# Patient Record
Sex: FEMALE | Race: WHITE | URBAN - METROPOLITAN AREA
[De-identification: names, ages, dates, MRNs, and addresses within clinical notes are randomized per-mention and may not be internally consistent; named-entity substitution may affect disease eponyms.]

---

## 2017-02-15 PROBLEM — Z98.890: Noted: 2017-02-15

## 2017-02-15 PROBLEM — Z98.890: Noted: 2020-11-24

## 2017-02-15 PROBLEM — Z98.890: Noted: 2017-02-01

## 2017-03-14 ENCOUNTER — IMPORTED ENCOUNTER (OUTPATIENT)
Dept: URBAN - METROPOLITAN AREA CLINIC 50 | Facility: CLINIC | Age: 77
End: 2017-03-14

## 2017-03-15 ENCOUNTER — IMPORTED ENCOUNTER (OUTPATIENT)
Dept: URBAN - METROPOLITAN AREA CLINIC 50 | Facility: CLINIC | Age: 77
End: 2017-03-15

## 2017-03-21 ENCOUNTER — IMPORTED ENCOUNTER (OUTPATIENT)
Dept: URBAN - METROPOLITAN AREA CLINIC 50 | Facility: CLINIC | Age: 77
End: 2017-03-21

## 2017-12-05 ENCOUNTER — IMPORTED ENCOUNTER (OUTPATIENT)
Dept: URBAN - METROPOLITAN AREA CLINIC 50 | Facility: CLINIC | Age: 77
End: 2017-12-05

## 2017-12-12 ENCOUNTER — IMPORTED ENCOUNTER (OUTPATIENT)
Dept: URBAN - METROPOLITAN AREA CLINIC 50 | Facility: CLINIC | Age: 77
End: 2017-12-12

## 2018-12-04 ENCOUNTER — IMPORTED ENCOUNTER (OUTPATIENT)
Dept: URBAN - METROPOLITAN AREA CLINIC 50 | Facility: CLINIC | Age: 78
End: 2018-12-04

## 2019-05-06 ENCOUNTER — IMPORTED ENCOUNTER (OUTPATIENT)
Dept: URBAN - METROPOLITAN AREA CLINIC 50 | Facility: CLINIC | Age: 79
End: 2019-05-06

## 2019-05-14 ENCOUNTER — IMPORTED ENCOUNTER (OUTPATIENT)
Dept: URBAN - METROPOLITAN AREA CLINIC 50 | Facility: CLINIC | Age: 79
End: 2019-05-14

## 2019-12-03 ENCOUNTER — IMPORTED ENCOUNTER (OUTPATIENT)
Dept: URBAN - METROPOLITAN AREA CLINIC 50 | Facility: CLINIC | Age: 79
End: 2019-12-03

## 2019-12-06 ENCOUNTER — IMPORTED ENCOUNTER (OUTPATIENT)
Dept: URBAN - METROPOLITAN AREA CLINIC 50 | Facility: CLINIC | Age: 79
End: 2019-12-06

## 2020-05-13 ENCOUNTER — IMPORTED ENCOUNTER (OUTPATIENT)
Dept: URBAN - METROPOLITAN AREA CLINIC 50 | Facility: CLINIC | Age: 80
End: 2020-05-13

## 2020-05-13 NOTE — PATIENT DISCUSSION
"""Start Warm compresses both eyes twice a day ."" ""Start Artificial tears both eyes BID - TID ."""

## 2020-05-18 ENCOUNTER — IMPORTED ENCOUNTER (OUTPATIENT)
Dept: URBAN - METROPOLITAN AREA CLINIC 50 | Facility: CLINIC | Age: 80
End: 2020-05-18

## 2020-11-12 ENCOUNTER — IMPORTED ENCOUNTER (OUTPATIENT)
Dept: URBAN - METROPOLITAN AREA CLINIC 50 | Facility: CLINIC | Age: 80
End: 2020-11-12

## 2020-11-24 ENCOUNTER — IMPORTED ENCOUNTER (OUTPATIENT)
Dept: URBAN - METROPOLITAN AREA CLINIC 50 | Facility: CLINIC | Age: 80
End: 2020-11-24

## 2020-12-16 ENCOUNTER — IMPORTED ENCOUNTER (OUTPATIENT)
Dept: URBAN - METROPOLITAN AREA CLINIC 50 | Facility: CLINIC | Age: 80
End: 2020-12-16

## 2021-04-18 ASSESSMENT — PACHYMETRY
OS_CT_UM: 575
OD_CT_UM: 571
OS_CT_UM: 575
OD_CT_UM: 571
OS_CT_UM: 575
OD_CT_UM: 571
OS_CT_UM: 575
OD_CT_UM: 571
OD_CT_UM: 571
OS_CT_UM: 575
OS_CT_UM: 575
OD_CT_UM: 571
OD_CT_UM: 571
OS_CT_UM: 575
OS_CT_UM: 575
OD_CT_UM: 571
OD_CT_UM: 571

## 2021-04-18 ASSESSMENT — VISUAL ACUITY
OS_SC: 20/25-2
OS_BAT: 20/30-
OD_SC: 20/20-1
OD_SC: 20/20
OD_CC: J1+
OD_SC: 20/20-2
OD_OTHER: 20/20. 20/20.
OS_CC: J1@ 14 IN
OD_BAT: 20/20
OS_SC: 20/20-
OD_OTHER: 20/30. 20/30-.
OD_BAT: 20/30
OS_CC: J1+@ 14 IN
OD_CC: J1+@ 14 IN
OS_SC: 20/20-1
OS_SC: 20/30+2
OD_SC: 20/20-2
OD_SC: 20/20-1
OD_CC: J1+
OD_OTHER: 20/25. 20/30.
OS_SC: 20/25
OD_SC: 20/20
OS_SC: 20/25+
OD_SC: 20/20
OS_OTHER: 20/30-. 20/60.
OD_SC: 20/20
OD_BAT: 20/20-2
OS_SC: 20/30-2
OD_BAT: 20/25
OS_CC: J1+
OS_SC: 20/30+2
OS_CC: J1+
OS_OTHER: 20/30. 20/40.
OD_CC: J1@ 14 IN
OD_SC: 20/20
OS_SC: 20/20
OS_BAT: 20/30

## 2021-04-18 ASSESSMENT — TONOMETRY
OD_IOP_MMHG: 15
OD_IOP_MMHG: 14
OS_IOP_MMHG: 14
OS_IOP_MMHG: 14
OS_IOP_MMHG: 13
OS_IOP_MMHG: 13
OD_IOP_MMHG: 13
OD_IOP_MMHG: 14
OS_IOP_MMHG: 16
OD_IOP_MMHG: 15
OS_IOP_MMHG: 13
OD_IOP_MMHG: 14
OS_IOP_MMHG: 13
OS_IOP_MMHG: 14
OS_IOP_MMHG: 14
OD_IOP_MMHG: 13
OS_IOP_MMHG: 14
OS_IOP_MMHG: 15
OD_IOP_MMHG: 15
OS_IOP_MMHG: 14
OS_IOP_MMHG: 15
OD_IOP_MMHG: 14
OS_IOP_MMHG: 13
OD_IOP_MMHG: 13
OD_IOP_MMHG: 13
OS_IOP_MMHG: 15
OD_IOP_MMHG: 16
OD_IOP_MMHG: 13

## 2021-05-19 ENCOUNTER — IMPORTED ENCOUNTER (OUTPATIENT)
Dept: URBAN - METROPOLITAN AREA CLINIC 52 | Facility: CLINIC | Age: 81
End: 2021-05-19

## 2021-05-20 ENCOUNTER — PROBLEM (OUTPATIENT)
Dept: URBAN - METROPOLITAN AREA CLINIC 52 | Facility: CLINIC | Age: 81
End: 2021-05-20

## 2021-05-20 DIAGNOSIS — H26.491: ICD-10-CM

## 2021-05-20 PROCEDURE — 66821 AFTER CATARACT LASER SURGERY: CPT

## 2021-05-20 PROCEDURE — 92134 CPTRZ OPH DX IMG PST SGM RTA: CPT

## 2021-05-20 PROCEDURE — 92014 COMPRE OPH EXAM EST PT 1/>: CPT

## 2021-05-20 RX ORDER — BROMFENAC SODIUM 0.7 MG/ML: 1 SOLUTION/ DROPS OPHTHALMIC TWICE A DAY

## 2021-05-20 ASSESSMENT — TONOMETRY
OD_IOP_MMHG: 15
OS_IOP_MMHG: 14
OS_IOP_MMHG: 15
OD_IOP_MMHG: 14

## 2021-05-20 ASSESSMENT — VISUAL ACUITY
OD_SC: 20/20-2
OS_SC: 20/20-1

## 2021-05-20 NOTE — PATIENT DISCUSSION
PCO ([id number]): Visually significant PCO present on exam today. Recommend YAG laser capsulotomy to improve vision and decrease glare symptoms. RBAs of procedure discussed. Patient agrees and wishes to proceed.

## 2021-05-20 NOTE — PROCEDURE NOTE: CLINICAL
PROCEDURE NOTE: YAG Capsulotomy OD. Diagnosis: Posterior Capsular Opacification (PCO). Prep: Mydriacil 1% and Phenylephrine 2.5%. Prior to treatment, the risks/benefits/alternatives were discussed. The patient wished to proceed with procedure. Consent was signed. Proparacaine and brominidine were placed into the operative eye after the eye was dilated. Power = 3.6mJ. Number of pulses = 35. Patient tolerated procedure well and there were no complications. Post Laser instructions given. Pantera Arboleda

## 2021-06-24 ENCOUNTER — YAG POST-OP (OUTPATIENT)
Dept: URBAN - METROPOLITAN AREA CLINIC 50 | Facility: CLINIC | Age: 81
End: 2021-06-24

## 2021-06-24 DIAGNOSIS — Z98.890: ICD-10-CM

## 2021-06-24 PROCEDURE — 99024 POSTOP FOLLOW-UP VISIT: CPT

## 2021-06-24 ASSESSMENT — VISUAL ACUITY
OU_CC: J1+
OS_SC: 20/25
OD_SC: 20/20-2

## 2021-06-24 ASSESSMENT — TONOMETRY
OD_IOP_MMHG: 17
OS_IOP_MMHG: 17

## 2021-11-22 ENCOUNTER — DIAGNOSTICS - HVF/OCT (OUTPATIENT)
Dept: URBAN - METROPOLITAN AREA CLINIC 50 | Facility: CLINIC | Age: 81
End: 2021-11-22

## 2021-11-22 DIAGNOSIS — H40.013: ICD-10-CM

## 2021-11-22 PROCEDURE — 92083 EXTENDED VISUAL FIELD XM: CPT

## 2021-11-22 PROCEDURE — 92133 CPTRZD OPH DX IMG PST SGM ON: CPT

## 2021-12-14 ENCOUNTER — COMPREHENSIVE EXAM (OUTPATIENT)
Dept: URBAN - METROPOLITAN AREA CLINIC 50 | Facility: CLINIC | Age: 81
End: 2021-12-14

## 2021-12-14 DIAGNOSIS — H04.123: ICD-10-CM

## 2021-12-14 DIAGNOSIS — Z01.01: ICD-10-CM

## 2021-12-14 DIAGNOSIS — H40.013: ICD-10-CM

## 2021-12-14 DIAGNOSIS — H02.834: ICD-10-CM

## 2021-12-14 DIAGNOSIS — H02.831: ICD-10-CM

## 2021-12-14 DIAGNOSIS — H43.813: ICD-10-CM

## 2021-12-14 PROCEDURE — 92015 DETERMINE REFRACTIVE STATE: CPT

## 2021-12-14 PROCEDURE — 92014 COMPRE OPH EXAM EST PT 1/>: CPT

## 2021-12-14 ASSESSMENT — VISUAL ACUITY
OS_SC: 20/25
OD_SC: 20/20-2
OS_CC: J1+
OD_CC: J1+
OU_CC: J1+
OU_SC: 20/20

## 2021-12-14 ASSESSMENT — TONOMETRY
OS_IOP_MMHG: 16
OD_IOP_MMHG: 16

## 2023-06-13 ENCOUNTER — COMPREHENSIVE EXAM (OUTPATIENT)
Dept: URBAN - METROPOLITAN AREA CLINIC 52 | Facility: CLINIC | Age: 83
End: 2023-06-13

## 2023-06-13 DIAGNOSIS — H43.813: ICD-10-CM

## 2023-06-13 DIAGNOSIS — H40.013: ICD-10-CM

## 2023-06-13 DIAGNOSIS — H04.123: ICD-10-CM

## 2023-06-13 DIAGNOSIS — H02.831: ICD-10-CM

## 2023-06-13 DIAGNOSIS — H02.834: ICD-10-CM

## 2023-06-13 DIAGNOSIS — Z01.01: ICD-10-CM

## 2023-06-13 PROCEDURE — 92015 DETERMINE REFRACTIVE STATE: CPT

## 2023-06-13 PROCEDURE — 92014 COMPRE OPH EXAM EST PT 1/>: CPT

## 2023-06-13 ASSESSMENT — KERATOMETRY
OS_AXISANGLE2_DEGREES: 130
OS_AXISANGLE_DEGREES: 040
OD_AXISANGLE2_DEGREES: 90
OS_K2POWER_DIOPTERS: 42.75
OD_K2POWER_DIOPTERS: 42.50
OD_K1POWER_DIOPTERS: 42.50
OS_K1POWER_DIOPTERS: 42.50
OD_AXISANGLE_DEGREES: 00

## 2023-06-13 ASSESSMENT — VISUAL ACUITY
OU_SC: 20/20-1
OS_SC: 20/20
OD_SC: 20/20

## 2023-06-13 ASSESSMENT — TONOMETRY
OD_IOP_MMHG: 13
OD_IOP_MMHG: 14
OS_IOP_MMHG: 13
OS_IOP_MMHG: 14

## 2023-06-14 ENCOUNTER — DIAGNOSTICS ONLY (OUTPATIENT)
Dept: URBAN - METROPOLITAN AREA CLINIC 52 | Facility: CLINIC | Age: 83
End: 2023-06-14

## 2023-06-14 DIAGNOSIS — H40.013: ICD-10-CM

## 2023-06-14 PROCEDURE — 92133 CPTRZD OPH DX IMG PST SGM ON: CPT

## 2023-06-14 PROCEDURE — 92083 EXTENDED VISUAL FIELD XM: CPT

## 2023-06-14 ASSESSMENT — KERATOMETRY
OD_K2POWER_DIOPTERS: 42.50
OD_AXISANGLE2_DEGREES: 90
OS_AXISANGLE2_DEGREES: 130
OS_AXISANGLE_DEGREES: 040
OS_K2POWER_DIOPTERS: 42.75
OD_K1POWER_DIOPTERS: 42.50
OS_K1POWER_DIOPTERS: 42.50
OD_AXISANGLE_DEGREES: 00

## 2024-06-25 ENCOUNTER — ESTABLISHED PATIENT (OUTPATIENT)
Dept: URBAN - METROPOLITAN AREA CLINIC 50 | Facility: LOCATION | Age: 84
End: 2024-06-25

## 2024-06-25 DIAGNOSIS — H02.831: ICD-10-CM

## 2024-06-25 DIAGNOSIS — H52.4: ICD-10-CM

## 2024-06-25 DIAGNOSIS — H43.813: ICD-10-CM

## 2024-06-25 DIAGNOSIS — Z01.01: ICD-10-CM

## 2024-06-25 DIAGNOSIS — H40.013: ICD-10-CM

## 2024-06-25 DIAGNOSIS — H02.834: ICD-10-CM

## 2024-06-25 PROCEDURE — 92014 COMPRE OPH EXAM EST PT 1/>: CPT

## 2024-06-25 PROCEDURE — 92015 DETERMINE REFRACTIVE STATE: CPT

## 2024-06-25 ASSESSMENT — KERATOMETRY
OD_AXISANGLE2_DEGREES: 90
OD_K1POWER_DIOPTERS: 42.50
OD_AXISANGLE_DEGREES: 00
OS_K1POWER_DIOPTERS: 42.50
OS_K2POWER_DIOPTERS: 42.75
OS_AXISANGLE2_DEGREES: 130
OS_AXISANGLE_DEGREES: 040
OD_K2POWER_DIOPTERS: 42.50

## 2024-06-25 ASSESSMENT — VISUAL ACUITY
OS_SC: 20/25
OU_CC: J1+ (-)
OD_SC: 20/25-1

## 2024-06-25 ASSESSMENT — TONOMETRY
OS_IOP_MMHG: 14
OD_IOP_MMHG: 15

## 2025-06-24 ENCOUNTER — COMPREHENSIVE EXAM (OUTPATIENT)
Age: 85
End: 2025-06-24

## 2025-06-24 DIAGNOSIS — Z01.01: ICD-10-CM

## 2025-06-24 DIAGNOSIS — H40.013: ICD-10-CM

## 2025-06-24 DIAGNOSIS — H43.813: ICD-10-CM

## 2025-06-24 DIAGNOSIS — H02.831: ICD-10-CM

## 2025-06-24 DIAGNOSIS — H52.4: ICD-10-CM

## 2025-06-24 DIAGNOSIS — H02.834: ICD-10-CM

## 2025-06-24 DIAGNOSIS — H04.123: ICD-10-CM

## 2025-06-24 PROCEDURE — 92014 COMPRE OPH EXAM EST PT 1/>: CPT

## 2025-06-24 PROCEDURE — 92015 DETERMINE REFRACTIVE STATE: CPT

## 2025-06-25 ENCOUNTER — DIAGNOSTICS ONLY (OUTPATIENT)
Age: 85
End: 2025-06-25

## 2025-06-25 DIAGNOSIS — H40.013: ICD-10-CM

## 2025-06-25 PROCEDURE — 92133 CPTRZD OPH DX IMG PST SGM ON: CPT

## 2025-06-25 PROCEDURE — 92083 EXTENDED VISUAL FIELD XM: CPT
